# Patient Record
Sex: FEMALE | Race: WHITE | NOT HISPANIC OR LATINO
[De-identification: names, ages, dates, MRNs, and addresses within clinical notes are randomized per-mention and may not be internally consistent; named-entity substitution may affect disease eponyms.]

---

## 2017-11-30 ENCOUNTER — RESULT REVIEW (OUTPATIENT)
Age: 57
End: 2017-11-30

## 2019-07-05 ENCOUNTER — RECORD ABSTRACTING (OUTPATIENT)
Age: 59
End: 2019-07-05

## 2019-07-05 DIAGNOSIS — Z86.39 PERSONAL HISTORY OF OTHER ENDOCRINE, NUTRITIONAL AND METABOLIC DISEASE: ICD-10-CM

## 2019-07-05 DIAGNOSIS — N95.2 POSTMENOPAUSAL ATROPHIC VAGINITIS: ICD-10-CM

## 2019-07-05 DIAGNOSIS — N76.2 ACUTE VULVITIS: ICD-10-CM

## 2019-07-05 DIAGNOSIS — Z87.19 PERSONAL HISTORY OF OTHER DISEASES OF THE DIGESTIVE SYSTEM: ICD-10-CM

## 2019-07-05 DIAGNOSIS — Z87.39 PERSONAL HISTORY OF OTHER DISEASES OF THE MUSCULOSKELETAL SYSTEM AND CONNECTIVE TISSUE: ICD-10-CM

## 2019-07-05 DIAGNOSIS — Z82.0 FAMILY HISTORY OF EPILEPSY AND OTHER DISEASES OF THE NERVOUS SYSTEM: ICD-10-CM

## 2019-07-05 DIAGNOSIS — Z83.3 FAMILY HISTORY OF DIABETES MELLITUS: ICD-10-CM

## 2019-07-05 DIAGNOSIS — Z87.09 PERSONAL HISTORY OF OTHER DISEASES OF THE RESPIRATORY SYSTEM: ICD-10-CM

## 2019-07-05 DIAGNOSIS — Z80.3 FAMILY HISTORY OF MALIGNANT NEOPLASM OF BREAST: ICD-10-CM

## 2019-07-05 DIAGNOSIS — Z82.3 FAMILY HISTORY OF STROKE: ICD-10-CM

## 2019-07-05 DIAGNOSIS — Z87.59 PERSONAL HISTORY OF OTHER COMPLICATIONS OF PREGNANCY, CHILDBIRTH AND THE PUERPERIUM: ICD-10-CM

## 2019-07-05 DIAGNOSIS — Z82.49 FAMILY HISTORY OF ISCHEMIC HEART DISEASE AND OTHER DISEASES OF THE CIRCULATORY SYSTEM: ICD-10-CM

## 2019-07-05 LAB — CYTOLOGY CVX/VAG DOC THIN PREP: NORMAL

## 2019-07-05 RX ORDER — ATORVASTATIN CALCIUM 20 MG/1
20 TABLET, FILM COATED ORAL DAILY
Refills: 0 | Status: ACTIVE | COMMUNITY

## 2019-07-05 RX ORDER — ASCORBIC ACID 1000 MG
TABLET ORAL
Refills: 0 | Status: ACTIVE | COMMUNITY

## 2019-09-19 ENCOUNTER — APPOINTMENT (OUTPATIENT)
Dept: OBGYN | Facility: CLINIC | Age: 59
End: 2019-09-19
Payer: COMMERCIAL

## 2019-09-19 VITALS
SYSTOLIC BLOOD PRESSURE: 114 MMHG | WEIGHT: 198 LBS | BODY MASS INDEX: 36.44 KG/M2 | DIASTOLIC BLOOD PRESSURE: 70 MMHG | HEIGHT: 62 IN

## 2019-09-19 PROCEDURE — 99396 PREV VISIT EST AGE 40-64: CPT

## 2019-09-21 LAB — HPV HIGH+LOW RISK DNA PNL CVX: NOT DETECTED

## 2019-09-24 LAB — CYTOLOGY CVX/VAG DOC THIN PREP: ABNORMAL

## 2020-07-17 DIAGNOSIS — N95.1 MENOPAUSAL AND FEMALE CLIMACTERIC STATES: ICD-10-CM

## 2020-07-17 DIAGNOSIS — Z13.820 ENCOUNTER FOR SCREENING FOR OSTEOPOROSIS: ICD-10-CM

## 2020-10-07 ENCOUNTER — RX RENEWAL (OUTPATIENT)
Age: 60
End: 2020-10-07

## 2021-04-01 ENCOUNTER — APPOINTMENT (OUTPATIENT)
Dept: OBGYN | Facility: CLINIC | Age: 61
End: 2021-04-01
Payer: COMMERCIAL

## 2021-04-01 VITALS
HEIGHT: 62 IN | DIASTOLIC BLOOD PRESSURE: 80 MMHG | BODY MASS INDEX: 36.07 KG/M2 | SYSTOLIC BLOOD PRESSURE: 122 MMHG | WEIGHT: 196 LBS

## 2021-04-01 DIAGNOSIS — R92.0 MAMMOGRAPHIC MICROCALCIFICATION FOUND ON DIAGNOSTIC IMAGING OF BREAST: ICD-10-CM

## 2021-04-01 PROCEDURE — 99396 PREV VISIT EST AGE 40-64: CPT

## 2021-04-01 PROCEDURE — 99072 ADDL SUPL MATRL&STAF TM PHE: CPT

## 2021-04-01 RX ORDER — ESTRADIOL 10 UG/1
10 INSERT VAGINAL
Refills: 0 | Status: DISCONTINUED | COMMUNITY
End: 2021-04-01

## 2021-04-01 RX ORDER — ESTRADIOL 10 UG/1
10 TABLET VAGINAL
Qty: 24 | Refills: 3 | Status: DISCONTINUED | COMMUNITY
End: 2021-04-01

## 2021-04-01 NOTE — HISTORY OF PRESENT ILLNESS
[Patient reported mammogram was normal] : Patient reported mammogram was normal [Patient reported colonoscopy was normal] : Patient reported colonoscopy was normal [Mammogramdate] : 12/3/20 [FreeTextEntry1] : 59 y/o  Postmenopausal using Vagifem twice weekly here for annual gyn exam. Pt is S/P LSO for benign papillary cystadenoma . Per patient reports feeling down, lost her brother in December with cancer(stomach/lower intestine). Has c/o left LE swelling and varicosities. Denies any complaints of breast changes including palpable lumps, discharge, or overlying skin changes. Mother with hx of DCIS which was diagnosed in her 80s and treated with radiation. Denies hx of abnormal mammograms in the past. Had last Mammo at [place]. States she is currently sexually active with her  of many years but has no libido. Her daughter is currently home suffering with depression and anxiety in day treatment program through [facility].Pt acknowledges that she is concerned about her cognitive function as her mother had dementia. Her daughter is pushing her to have neurologic evaluation.Patient has tracheal stenosis, opted out of major thoracic surgery [facility], had repeat dilatation done at [facility] last July Pt has a h/o osteopenia and was treated in the past with bisphosphonate, stopped for dental work in ~   \par  [TextBox_19] : WPH [PapSmeardate] : 9/19/19 [TextBox_31] : Neg/HPV Neg [TextBox_37] : [facility] T Score Spine -2.7 Hip -1.1 Fem Neck -1.0 [BoneDensityDate] : 12/3/20 [ColonoscopyDate] : 2016

## 2021-07-28 ENCOUNTER — APPOINTMENT (OUTPATIENT)
Dept: NEUROLOGY | Facility: CLINIC | Age: 61
End: 2021-07-28
Payer: COMMERCIAL

## 2021-07-28 VITALS
OXYGEN SATURATION: 98 % | TEMPERATURE: 97.8 F | BODY MASS INDEX: 36.25 KG/M2 | HEART RATE: 91 BPM | DIASTOLIC BLOOD PRESSURE: 85 MMHG | HEIGHT: 62 IN | SYSTOLIC BLOOD PRESSURE: 140 MMHG | WEIGHT: 197 LBS

## 2021-07-28 PROCEDURE — 99244 OFF/OP CNSLTJ NEW/EST MOD 40: CPT

## 2021-08-02 NOTE — ASSESSMENT
[FreeTextEntry1] : For her cognitive complaints, I will obtain MRI of the brain.  She will get an formal neuropsychological evaluation.\par \par It has been recommended that she get a sleep study, and I will order this now.  She is willing to do this after I explained that untreated sleep apnea could also cause memory issues.\par \par Further recommendations after test results are available.

## 2021-08-02 NOTE — HISTORY OF PRESENT ILLNESS
[FreeTextEntry1] : This is a 61-year-old right-handed woman who is being seen in neurologic consultation for evaluation of problems with memory.  She notes difficulty in both short-term and long-term memory.  She is more forgetful.  There is significant word finding problems.  She has not noticed any problems driving or recognizing people.  Her  pays the bills so she has not noticed any problems.  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 Was advised to get sleep study but has not done so.\par \par \par Mother- dementia and PD\par

## 2021-08-02 NOTE — CONSULT LETTER
[Dear  ___] : Dear  [unfilled], [Consult Letter:] : I had the pleasure of evaluating your patient, [unfilled]. [Please see my note below.] : Please see my note below. [FreeTextEntry3] : Sincerely,\par \par Melida Gatica M.D.\par

## 2021-08-02 NOTE — PHYSICAL EXAM
[FreeTextEntry1] : Physical examination \par General: No acute distress, Awake, Alert. \par Fundus: Unable\par Neck: no Carotid bruit. \par Cardiovascular: Normal rate, Regular rhythm, No murmur. \par Chest - clear bilaterally\par \par Mental status \par Awake, alert, and oriented to person, time and place, Normal attention span and concentration, Recent and remote memory intact, Language intact, Fund of knowledge intact. \par Cranial Nerves \par II: VFF \par III, IV, VI: PERRL, EOMI. \par V: Facial sensation is normal B/L. \par VII: Facial strength is normal B/L. \par VIII: Gross hearing is intact. \par IX, X: Palate is midline and elevates symmetrically. \par XI: Trapezius normal strength. \par XII: Tongue midline without atrophy or fasciculations. \par \par Motor exam \par Muscle tone - no evidence of rigidity or resistance in all 4 extremities. \par No atrophy or fasciculations \par Muscle Strength: arms and legs, proximal and distal flexors and extensors are normal \par No UE drift.\par \par Reflexes \par All present, normal, and symmetrical. \par Plantars right: mute. \par Plantars left: mute. \par Absent ankle jerks. \par \par Coordination \par Finger to nose: Normal. \par Heel to shin: Normal. \par \par Gait \par Normal\par

## 2021-09-15 ENCOUNTER — RESULT REVIEW (OUTPATIENT)
Age: 61
End: 2021-09-15

## 2021-10-06 ENCOUNTER — APPOINTMENT (OUTPATIENT)
Dept: NEUROLOGY | Facility: CLINIC | Age: 61
End: 2021-10-06
Payer: COMMERCIAL

## 2021-10-06 ENCOUNTER — NON-APPOINTMENT (OUTPATIENT)
Age: 61
End: 2021-10-06

## 2021-10-06 ENCOUNTER — TRANSCRIPTION ENCOUNTER (OUTPATIENT)
Age: 61
End: 2021-10-06

## 2021-10-06 PROCEDURE — 95806 SLEEP STUDY UNATT&RESP EFFT: CPT

## 2021-10-07 ENCOUNTER — APPOINTMENT (OUTPATIENT)
Dept: NEUROLOGY | Facility: CLINIC | Age: 61
End: 2021-10-07

## 2021-10-13 ENCOUNTER — NON-APPOINTMENT (OUTPATIENT)
Age: 61
End: 2021-10-13

## 2021-11-15 ENCOUNTER — TRANSCRIPTION ENCOUNTER (OUTPATIENT)
Age: 61
End: 2021-11-15

## 2022-03-28 ENCOUNTER — APPOINTMENT (OUTPATIENT)
Dept: NEUROLOGY | Facility: CLINIC | Age: 62
End: 2022-03-28
Payer: COMMERCIAL

## 2022-03-28 VITALS
DIASTOLIC BLOOD PRESSURE: 86 MMHG | TEMPERATURE: 98.2 F | OXYGEN SATURATION: 99 % | BODY MASS INDEX: 36.8 KG/M2 | HEIGHT: 62 IN | HEART RATE: 95 BPM | SYSTOLIC BLOOD PRESSURE: 143 MMHG | WEIGHT: 200 LBS

## 2022-03-28 DIAGNOSIS — G47.33 OBSTRUCTIVE SLEEP APNEA (ADULT) (PEDIATRIC): ICD-10-CM

## 2022-03-28 PROCEDURE — 99214 OFFICE O/P EST MOD 30 MIN: CPT

## 2022-03-29 NOTE — REVIEW OF SYSTEMS
[Joint Pain] : joint pain [Joint Stiffness] : joint stiffness [Negative] : ENT [Fever] : no fever [Chills] : no chills [Feeling Tired] : not feeling tired [Numbness] : no numbness [Tingling] : no tingling [Seizures] : no convulsions [Dizziness] : no dizziness [Tension Headache] : no tension-type headache [Anxiety] : no anxiety [Depression] : no depression [Eye Pain] : no eye pain [Red Eyes] : eyes not red [Loss Of Hearing] : no hearing loss [Chest Pain] : no chest pain [Palpitations] : no palpitations [Shortness Of Breath] : no shortness of breath [Wheezing] : no wheezing [Cough] : no cough [Constipation] : no constipation [Diarrhea] : no diarrhea [Incontinence] : no incontinence [Pelvic Pain] : no pelvic pain [Joint Swelling] : no joint swelling [Itching] : no itching [Muscle Weakness] : no muscle weakness [Easy Bleeding] : no tendency for easy bleeding [Easy Bruising] : no tendency for easy bruising [Swollen Glands] : no swollen glands

## 2022-03-29 NOTE — ASSESSMENT
[FreeTextEntry1] : Exercise daily\par Conside therapy for depression and anxiety\par consider SSRI- willing to try Lexapro - will see psychiatrist.\par \par Recommend f/u with sleep specialist.\par \par Discussed importance of being physically, socially, and mentally active.\par \par f/u one year.

## 2022-03-29 NOTE — HISTORY OF PRESENT ILLNESS
[FreeTextEntry1] : 3/28/22\par Leticia is here in f/u. Continues to note memory issues.\par Reviewed MRI brain and neuropsychological evaluation.\par Not sleeping well. Reviewed Sleep study result.\par \par Neuropsycholgical evaluation by Dr. Reed shows no evidence for neuro-degenerative disease. Raises concern for depression and anxiety.\par \par 7/28/21\par This is a 61-year-old right-handed woman who is being seen in neurologic consultation for evaluation of problems with memory.  She notes difficulty in both short-term and long-term memory.  She is more forgetful.  There is significant word finding problems.  She has not noticed any problems driving or recognizing people.  Her  pays the bills so she has not noticed any problems.  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 Was advised to get sleep study but has not done so.\par \par \par Mother- dementia and PD\par

## 2022-03-29 NOTE — PHYSICAL EXAM
[FreeTextEntry1] : Physical examination \par General: No acute distress, Awake, Alert. \par \par Mental status \par Awake, alert, and oriented to person, time and place, Normal attention span and concentration, Recent and remote memory intact, Language intact, Fund of knowledge intact. \par Cranial Nerves \par II: VFF \par III, IV, VI: PERRL, EOMI. \par V: Facial sensation is normal B/L. \par VII: Facial strength is normal B/L. \par VIII: Gross hearing is intact. \par IX, X: Palate is midline and elevates symmetrically. \par XI: Trapezius normal strength. \par XII: Tongue midline without atrophy or fasciculations. \par \par Motor exam \par Muscle tone - no evidence of rigidity or resistance in all 4 extremities. \par No atrophy or fasciculations \par Muscle Strength: arms and legs, proximal and distal flexors and extensors are normal \par No UE drift.\par \par Coordination \par Finger to nose: Normal. \par Heel to shin: Normal. \par \par Gait \par Normal\par

## 2022-03-29 NOTE — DATA REVIEWED
[de-identified] : \par  [place] MRI Report             Final\par \par No Documents Attached\par \par \par \par \par   [facility]\par                                          [address]\par                                    [place], [place]  [address]\par                                        Department of Radiology\par                                             [phone]\par \par \par Patient Name:      DIOGO ARNOLD                Location:       [facility]\par Med Rec #:        [id number]                    Account #:      [id number]\par YOB: 1960                    Ordering:       Daisy Gatica MD\par Age: 61               Sex:    F                 Attending:      Daisy Gatica MD\par PCP:        Emiliano Garrison MD\par ______________________________________________________________________________________\par \par Exam Date:      09/15/21\par Exam:         MRI BRAIN\par Order#:       MRI [id number]\par \par \par \par HISTORY: 61 years Female MEMORY CHANGE MRI\par \par  PROCEDURE: MRI brain performed without contrast\par \par  COMPARISON: None\par \par  TECHNIQUE:  MRI BRAIN 1.5 Tea magnet without administration of intravenous contrast.\par \par \par  FINDINGS:\par  Sagittal T1-weighted imaging demonstrates normal appearance of the midline structures including the\par clivus, sella, and craniocervical junction.\par \par  There is no extra-axial collection, intraparenchymal hemorrhage, midline shift, or mass effect\par \par  The ventricles and basilar cisterns appear within range of normal.\par \par  There is no restricted diffusion on today's exam to suggest a recent ischemic event.\par \par  There is no abnormal susceptibility artifact to suggest microhemorrhage is or deposition disease.\par \par  The central [place] of Kim flow voids and major dural venous sinus flow voids are present.\par \par  The extracranial tissues including the orbits appear within range of normal.\par \par \par \par  IMPRESSION:\par  No evidence of acute intracranial process\par \par  Thank you for allowing us to participate in the evaluation of this patient.\par \par  --- End of Report ---\par \par ***Electronically Signed ***\par -----------------------------------------------\par René Hoclomb MD              09/15/21 1424\par \par Dictated on 09/15/21\par \par \par Report cc:  Daisy Gatica MD;\par \par  \par \par  Ordered by: DAISY GATICA       Collected/Examined: 15Sep2021 09:19AM       \par Verified by: DAISY GATICA 15Sep2021 03:37PM       \par  Result Communication: No patient communication needed at this time;\par Stage: Final       \par  Performed at: [facility]       Resulted: 15Sep2021 02:24PM       Last Updated: 15Sep2021 03:37PM       Accession: [id number]

## 2022-04-26 ENCOUNTER — APPOINTMENT (OUTPATIENT)
Dept: ENDOCRINOLOGY | Facility: CLINIC | Age: 62
End: 2022-04-26
Payer: COMMERCIAL

## 2022-04-26 VITALS — OXYGEN SATURATION: 98 % | HEART RATE: 75 BPM | DIASTOLIC BLOOD PRESSURE: 84 MMHG | SYSTOLIC BLOOD PRESSURE: 128 MMHG

## 2022-04-26 VITALS — WEIGHT: 194 LBS | HEIGHT: 62 IN | BODY MASS INDEX: 35.7 KG/M2

## 2022-04-26 DIAGNOSIS — R35.89 OTHER POLYURIA: ICD-10-CM

## 2022-04-26 DIAGNOSIS — R53.82 CHRONIC FATIGUE, UNSPECIFIED: ICD-10-CM

## 2022-04-26 DIAGNOSIS — R53.81 CHRONIC FATIGUE, UNSPECIFIED: ICD-10-CM

## 2022-04-26 PROCEDURE — 99205 OFFICE O/P NEW HI 60 MIN: CPT

## 2022-04-26 RX ORDER — ADHESIVE TAPE 3"X 2.3 YD
5 TAPE, NON-MEDICATED TOPICAL
Refills: 0 | Status: ACTIVE | COMMUNITY

## 2022-04-26 RX ORDER — LORATADINE 5 MG/5 ML
10 SOLUTION, ORAL ORAL
Refills: 0 | Status: ACTIVE | COMMUNITY

## 2022-04-26 NOTE — REVIEW OF SYSTEMS
[Fatigue] : fatigue [Lower Ext Edema] : lower extremity edema [Cough] : cough [Nocturia] : nocturia [Myalgia] : myalgia  [Muscle Cramps] : muscle cramps [Depression] : depression [Polydipsia] : polydipsia [Negative] : Heme/Lymph [Polyuria] : no polyuria [FreeTextEntry2] : chronic fatigue [FreeTextEntry5] : venous insufficiency  [FreeTextEntry6] : loud snoring  [FreeTextEntry8] : urinary frequency  [de-identified] : itching  [de-identified] : memory problems, problems with concentration

## 2022-04-26 NOTE — HISTORY OF PRESENT ILLNESS
[FreeTextEntry1] : Ms. DIOGO ARNOLD is a 61 year old female self-referred for chronic fatigue hypothyroidism prediabetes\par Has not seen endocrinology before today with her daughter who is also a patient of mine\par hypothyroidism for 10-15yrs \par tracheal stenosis requring dilatations\par mild slpeep apnea on 10/21 sleep study reviewed\par \par also impaired fasting sugar per pt from last labwork \par \par mother Vanzoegabrielle, Miki, also she has had memory problems seen neurology was told all good \par \par TSH 4.67\par Fasting sugar 113, paternal aunt DM2\par \par FHx thyroid cancer\par

## 2022-06-02 ENCOUNTER — LABORATORY RESULT (OUTPATIENT)
Age: 62
End: 2022-06-02

## 2022-06-07 ENCOUNTER — APPOINTMENT (OUTPATIENT)
Dept: ENDOCRINOLOGY | Facility: CLINIC | Age: 62
End: 2022-06-07
Payer: COMMERCIAL

## 2022-06-07 VITALS — WEIGHT: 189 LBS | BODY MASS INDEX: 34.78 KG/M2 | HEIGHT: 62 IN

## 2022-06-07 LAB
ALBUMIN SERPL ELPH-MCNC: 4.8 G/DL
ALP BLD-CCNC: 88 U/L
ALT SERPL-CCNC: 21 U/L
ANION GAP SERPL CALC-SCNC: 14 MMOL/L
APPEARANCE: CLEAR
AST SERPL-CCNC: 23 U/L
BILIRUB SERPL-MCNC: 0.4 MG/DL
BILIRUBIN URINE: NEGATIVE
BLOOD URINE: NEGATIVE
BUN SERPL-MCNC: 9 MG/DL
CALCIUM SERPL-MCNC: 9.6 MG/DL
CHLORIDE SERPL-SCNC: 98 MMOL/L
CHOLEST SERPL-MCNC: 188 MG/DL
CO2 SERPL-SCNC: 25 MMOL/L
COLOR: NORMAL
CREAT SERPL-MCNC: 0.63 MG/DL
EGFR: 100 ML/MIN/1.73M2
ESTIMATED AVERAGE GLUCOSE: 108 MG/DL
FERRITIN SERPL-MCNC: 78 NG/ML
FOLATE SERPL-MCNC: >20 NG/ML
GLUCOSE QUALITATIVE U: NEGATIVE
GLUCOSE SERPL-MCNC: 99 MG/DL
HBA1C MFR BLD HPLC: 5.4 %
HDLC SERPL-MCNC: 39 MG/DL
IRON SATN MFR SERPL: 29 %
IRON SERPL-MCNC: 96 UG/DL
KETONES URINE: NEGATIVE
LDLC SERPL CALC-MCNC: 122 MG/DL
LEUKOCYTE ESTERASE URINE: NEGATIVE
NITRITE URINE: NEGATIVE
NONHDLC SERPL-MCNC: 148 MG/DL
PH URINE: 7.5
POTASSIUM SERPL-SCNC: 4.7 MMOL/L
PROT SERPL-MCNC: 6.7 G/DL
PROTEIN URINE: NEGATIVE
SODIUM SERPL-SCNC: 137 MMOL/L
SPECIFIC GRAVITY URINE: 1.01
T4 FREE SERPL-MCNC: 1.3 NG/DL
THYROGLOB AB SERPL-ACNC: <20 IU/ML
THYROPEROXIDASE AB SERPL IA-ACNC: <10 IU/ML
TIBC SERPL-MCNC: 327 UG/DL
TRIGL SERPL-MCNC: 131 MG/DL
TSH SERPL-ACNC: 2.26 UIU/ML
UIBC SERPL-MCNC: 231 UG/DL
UROBILINOGEN URINE: NORMAL
VIT B12 SERPL-MCNC: 1141 PG/ML

## 2022-06-07 PROCEDURE — 99215 OFFICE O/P EST HI 40 MIN: CPT | Mod: 95

## 2022-06-07 RX ORDER — LEVOTHYROXINE SODIUM 0.05 MG/1
50 TABLET ORAL DAILY
Refills: 0 | Status: DISCONTINUED | COMMUNITY
End: 2022-06-07

## 2022-06-07 RX ORDER — CETIRIZINE HYDROCHLORIDE 10 MG/1
10 TABLET, FILM COATED ORAL
Refills: 0 | Status: DISCONTINUED | COMMUNITY
End: 2022-06-07

## 2022-06-07 RX ORDER — ESCITALOPRAM OXALATE 10 MG/1
10 TABLET ORAL
Qty: 30 | Refills: 2 | Status: DISCONTINUED | COMMUNITY
Start: 2022-03-28 | End: 2022-06-07

## 2022-06-07 NOTE — HISTORY OF PRESENT ILLNESS
[Home] : at home, [unfilled] , at the time of the visit. [Medical Office: ([facility])___] : at the medical office located in  [Verbal consent obtained from patient] : the patient, [unfilled] [FreeTextEntry1] : Ms. DIOGO ARNOLD is a 61 year old female self-referred for chronic fatigue hypothyroidism prediabetes today first follow-up\par Has not seen endocrinology before today with her daughter who is also a patient of mine\par hypothyroidism for 10-15yrs \par tracheal stenosis requring dilatations\par mild slpeep apnea on 10/21 sleep study reviewed\par \par also impaired fasting sugar per pt from last labwork \par \par mother LouisagabrielleMiki, also she has had memory problems seen neurology was told all good \par \par TSH 4.67\par Fasting sugar 113, paternal aunt DM2\par \par FHx thyroid cancer\par

## 2022-06-07 NOTE — REVIEW OF SYSTEMS
[Fatigue] : fatigue [Lower Ext Edema] : lower extremity edema [Cough] : cough [Nocturia] : nocturia [Myalgia] : myalgia  [Muscle Cramps] : muscle cramps [Depression] : depression [Polydipsia] : polydipsia [Negative] : Heme/Lymph [Polyuria] : no polyuria [FreeTextEntry2] : chronic fatigue [FreeTextEntry6] : loud snoring  [FreeTextEntry5] : venous insufficiency  [FreeTextEntry8] : urinary frequency  [de-identified] : itching  [de-identified] : memory problems, problems with concentration

## 2022-06-09 ENCOUNTER — RESULT REVIEW (OUTPATIENT)
Age: 62
End: 2022-06-09

## 2022-06-24 ENCOUNTER — TRANSCRIPTION ENCOUNTER (OUTPATIENT)
Age: 62
End: 2022-06-24

## 2022-06-24 PROBLEM — R92.0 BREAST MICROCALCIFICATIONS: Status: ACTIVE | Noted: 2022-06-24

## 2022-07-15 DIAGNOSIS — N63.0 UNSPECIFIED LUMP IN UNSPECIFIED BREAST: ICD-10-CM

## 2022-09-07 ENCOUNTER — RX RENEWAL (OUTPATIENT)
Age: 62
End: 2022-09-07

## 2022-09-12 ENCOUNTER — APPOINTMENT (OUTPATIENT)
Dept: ENDOCRINOLOGY | Facility: CLINIC | Age: 62
End: 2022-09-12

## 2022-09-12 VITALS
DIASTOLIC BLOOD PRESSURE: 80 MMHG | HEIGHT: 62 IN | HEART RATE: 70 BPM | BODY MASS INDEX: 34.23 KG/M2 | OXYGEN SATURATION: 98 % | SYSTOLIC BLOOD PRESSURE: 124 MMHG | WEIGHT: 186 LBS

## 2022-09-12 LAB
25(OH)D3 SERPL-MCNC: 41.2 NG/ML
ALBUMIN SERPL ELPH-MCNC: 4.5 G/DL
ALP BLD-CCNC: 78 U/L
ALT SERPL-CCNC: 17 U/L
ANION GAP SERPL CALC-SCNC: 10 MMOL/L
AST SERPL-CCNC: 20 U/L
BILIRUB SERPL-MCNC: 0.3 MG/DL
BUN SERPL-MCNC: 13 MG/DL
CALCIUM SERPL-MCNC: 9.8 MG/DL
CALCIUM SERPL-MCNC: 9.8 MG/DL
CHLORIDE SERPL-SCNC: 100 MMOL/L
CO2 SERPL-SCNC: 27 MMOL/L
CREAT SERPL-MCNC: 0.68 MG/DL
EGFR: 97 ML/MIN/1.73M2
GLUCOSE SERPL-MCNC: 106 MG/DL
PARATHYROID HORMONE INTACT: 53 PG/ML
POTASSIUM SERPL-SCNC: 5.2 MMOL/L
PROT SERPL-MCNC: 6.4 G/DL
SODIUM SERPL-SCNC: 137 MMOL/L
T4 FREE SERPL-MCNC: 1.3 NG/DL
TSH SERPL-ACNC: 2.15 UIU/ML

## 2022-09-12 PROCEDURE — 99215 OFFICE O/P EST HI 40 MIN: CPT

## 2022-09-12 RX ORDER — ESCITALOPRAM OXALATE 20 MG/1
20 TABLET ORAL
Qty: 30 | Refills: 0 | Status: ACTIVE | COMMUNITY
Start: 2022-06-22

## 2022-09-12 NOTE — HISTORY OF PRESENT ILLNESS
[Home] : at home, [unfilled] , at the time of the visit. [Medical Office: ([facility])___] : at the medical office located in  [Verbal consent obtained from patient] : the patient, [unfilled] [FreeTextEntry1] : Ms. DIOGO ARNOLD is a 62 year old female self-referred for chronic fatigue hypothyroidism prediabetes today first follow-up\par restarted vaginal estrogen today \par Has not seen endocrinology before\par  her daughter  a patient of mine\par hypothyroidism for 10-15yrs \par tracheal stenosis requring dilatations\par mild slpeep apnea on 10/21 sleep study reviewed\par \par also impaired fasting sugar per pt from last labwork \par \par mother Miki Mena, also she has had memory problems seen neurology was told all good \par \par TSH 4.67\par Fasting sugar 113, paternal aunt DM2\par \par FHx thyroid cancer\par

## 2022-09-12 NOTE — REVIEW OF SYSTEMS
[Fatigue] : fatigue [Lower Ext Edema] : lower extremity edema [Cough] : cough [Nocturia] : nocturia [Myalgia] : myalgia  [Muscle Cramps] : muscle cramps [Depression] : depression [Polydipsia] : polydipsia [Negative] : Heme/Lymph [Polyuria] : no polyuria [FreeTextEntry2] : chronic fatigue [FreeTextEntry5] : venous insufficiency  [FreeTextEntry6] : loud snoring  [FreeTextEntry8] : urinary frequency  [de-identified] : itching  [de-identified] : memory problems, problems with concentration

## 2022-10-24 ENCOUNTER — APPOINTMENT (OUTPATIENT)
Dept: ENDOCRINOLOGY | Facility: CLINIC | Age: 62
End: 2022-10-24

## 2022-12-06 ENCOUNTER — APPOINTMENT (OUTPATIENT)
Dept: OBGYN | Facility: CLINIC | Age: 62
End: 2022-12-06

## 2022-12-06 VITALS
BODY MASS INDEX: 33.49 KG/M2 | HEIGHT: 62 IN | DIASTOLIC BLOOD PRESSURE: 80 MMHG | SYSTOLIC BLOOD PRESSURE: 130 MMHG | WEIGHT: 182 LBS

## 2022-12-06 DIAGNOSIS — Z92.89 PERSONAL HISTORY OF OTHER MEDICAL TREATMENT: ICD-10-CM

## 2022-12-06 DIAGNOSIS — Z11.51 ENCOUNTER FOR SCREENING FOR HUMAN PAPILLOMAVIRUS (HPV): ICD-10-CM

## 2022-12-06 DIAGNOSIS — Z98.890 OTHER SPECIFIED POSTPROCEDURAL STATES: ICD-10-CM

## 2022-12-06 DIAGNOSIS — Z12.39 ENCOUNTER FOR OTHER SCREENING FOR MALIGNANT NEOPLASM OF BREAST: ICD-10-CM

## 2022-12-06 DIAGNOSIS — Z12.4 ENCOUNTER FOR SCREENING FOR MALIGNANT NEOPLASM OF CERVIX: ICD-10-CM

## 2022-12-06 PROCEDURE — 99396 PREV VISIT EST AGE 40-64: CPT

## 2022-12-06 RX ORDER — ACETAMINOPHEN AND CODEINE PHOSPHATE 300; 30 MG/1; MG/1
300-30 TABLET ORAL
Qty: 10 | Refills: 0 | Status: DISCONTINUED | COMMUNITY
Start: 2022-12-02

## 2022-12-06 RX ORDER — COVID-19 ANTIGEN TEST
KIT MISCELLANEOUS
Qty: 5 | Refills: 0 | Status: DISCONTINUED | COMMUNITY
Start: 2022-10-14

## 2022-12-06 RX ORDER — AMOXICILLIN 500 MG/1
500 CAPSULE ORAL
Qty: 21 | Refills: 0 | Status: DISCONTINUED | COMMUNITY
Start: 2022-12-02

## 2022-12-06 NOTE — PHYSICAL EXAM
[Chaperone Declined] : Patient declined chaperone [Appropriately responsive] : appropriately responsive [Alert] : alert [No Acute Distress] : no acute distress [No Lymphadenopathy] : no lymphadenopathy [Regular Rate Rhythm] : regular rate rhythm [No Murmurs] : no murmurs [Clear to Auscultation B/L] : clear to auscultation bilaterally [Soft] : soft [Non-tender] : non-tender [Non-distended] : non-distended [No HSM] : No HSM [No Mass] : no mass [Oriented x3] : oriented x3 [Examination Of The Breasts] : a normal appearance [No Discharge] : no discharge [No Masses] : no breast masses were palpable [No Lesions] : no lesions  [Labia Majora] : normal [Labia Minora] : normal [Normal] : normal [Uterine Adnexae] : normal [Pink Rugae] : pink rugae [Tenderness] : nontender [Enlarged ___ wks] : not enlarged [FreeTextEntry5] : no CMT

## 2022-12-06 NOTE — HISTORY OF PRESENT ILLNESS
[FreeTextEntry1] : 63 y/o  PM presents for annual GYN exam.\par \par Still sexually active with . Sometimes forgets to use Vagifem (Encouraged regular use for optimal results).\par \par Feels better emotionally. Seeing psychiatrist and therapist. Was told memory tests were normal and likely symptoms were related to depression.\par \par Seeing vein specialist for venous insufficiency.\par \par Scheduled for tracheal dilation.\par \par No PMB bleeding. No GYN complaints.\par \par S/P benign left breast biopsy in July. Needs to schedule 6 month f/u.\par \par Bone density 2020 osteopenia- followed by Dr. Inman. Needs to schedule repeat.\par \par Pt reports normal colonoscopy in .\par \par Pap  19- NILM/HPV-\par \par Mother breast ca.\par \par Denies FH of Ca of ovary, colon, or uterus.\par \par Lives with  and 25 year old daughter who has history of depression (now improved).\par \par Retired teacher.

## 2022-12-07 LAB — HPV HIGH+LOW RISK DNA PNL CVX: NOT DETECTED

## 2022-12-15 ENCOUNTER — TRANSCRIPTION ENCOUNTER (OUTPATIENT)
Age: 62
End: 2022-12-15

## 2022-12-15 LAB — CYTOLOGY CVX/VAG DOC THIN PREP: ABNORMAL

## 2023-01-19 ENCOUNTER — APPOINTMENT (OUTPATIENT)
Dept: ENDOCRINOLOGY | Facility: CLINIC | Age: 63
End: 2023-01-19

## 2023-01-26 DIAGNOSIS — Z98.890 OTHER SPECIFIED POSTPROCEDURAL STATES: ICD-10-CM

## 2023-02-02 ENCOUNTER — APPOINTMENT (OUTPATIENT)
Dept: ENDOCRINOLOGY | Facility: CLINIC | Age: 63
End: 2023-02-02
Payer: COMMERCIAL

## 2023-02-02 VITALS
SYSTOLIC BLOOD PRESSURE: 132 MMHG | DIASTOLIC BLOOD PRESSURE: 80 MMHG | WEIGHT: 187 LBS | HEIGHT: 62 IN | BODY MASS INDEX: 34.41 KG/M2 | HEART RATE: 82 BPM | OXYGEN SATURATION: 98 %

## 2023-02-02 LAB
25(OH)D3 SERPL-MCNC: 31.7 NG/ML
ALBUMIN SERPL ELPH-MCNC: 4.6 G/DL
ALP BLD-CCNC: 81 U/L
ALT SERPL-CCNC: 22 U/L
ANION GAP SERPL CALC-SCNC: 15 MMOL/L
AST SERPL-CCNC: 22 U/L
BILIRUB SERPL-MCNC: 0.3 MG/DL
BUN SERPL-MCNC: 14 MG/DL
CALCIUM SERPL-MCNC: 9.9 MG/DL
CALCIUM SERPL-MCNC: 9.9 MG/DL
CHLORIDE SERPL-SCNC: 97 MMOL/L
CHOLEST SERPL-MCNC: 208 MG/DL
CO2 SERPL-SCNC: 25 MMOL/L
CREAT SERPL-MCNC: 0.65 MG/DL
EGFR: 98 ML/MIN/1.73M2
ESTIMATED AVERAGE GLUCOSE: 111 MG/DL
GLUCOSE SERPL-MCNC: 100 MG/DL
HBA1C MFR BLD HPLC: 5.5 %
HDLC SERPL-MCNC: 54 MG/DL
LDLC SERPL CALC-MCNC: 131 MG/DL
MAGNESIUM SERPL-MCNC: 2 MG/DL
NONHDLC SERPL-MCNC: 154 MG/DL
PARATHYROID HORMONE INTACT: 31 PG/ML
PHOSPHATE SERPL-MCNC: 4.2 MG/DL
POTASSIUM SERPL-SCNC: 5.6 MMOL/L
PROT SERPL-MCNC: 6.6 G/DL
SODIUM SERPL-SCNC: 137 MMOL/L
T4 FREE SERPL-MCNC: 1.3 NG/DL
TRIGL SERPL-MCNC: 115 MG/DL
TSH SERPL-ACNC: 1.93 UIU/ML

## 2023-02-02 PROCEDURE — G0447 BEHAVIOR COUNSEL OBESITY 15M: CPT

## 2023-02-02 PROCEDURE — 99215 OFFICE O/P EST HI 40 MIN: CPT | Mod: 25

## 2023-02-02 RX ORDER — CALCIUM CARBONATE/VITAMIN D3 600MG-5MCG
TABLET ORAL
Refills: 0 | Status: ACTIVE | COMMUNITY

## 2023-02-02 RX ORDER — CALCIUM CARBONATE/VITAMIN D3 500 MG-2.5
TABLET,CHEWABLE ORAL
Refills: 0 | Status: DISCONTINUED | COMMUNITY
End: 2023-02-02

## 2023-02-02 RX ORDER — SODIUM SULFATE, MAGNESIUM SULFATE, AND POTASSIUM CHLORIDE 17.75; 2.7; 2.25 G/1; G/1; G/1
1479-225-188 TABLET ORAL
Qty: 24 | Refills: 0 | Status: DISCONTINUED | COMMUNITY
Start: 2022-04-11 | End: 2023-02-02

## 2023-02-02 RX ORDER — FAMOTIDINE 20 MG/1
20 TABLET, FILM COATED ORAL
Refills: 0 | Status: ACTIVE | COMMUNITY

## 2023-02-02 NOTE — REVIEW OF SYSTEMS
[Fatigue] : fatigue [Lower Ext Edema] : lower extremity edema [Cough] : cough [Nocturia] : nocturia [Myalgia] : myalgia  [Muscle Cramps] : muscle cramps [Depression] : depression [Polydipsia] : polydipsia [Negative] : Heme/Lymph [Polyuria] : no polyuria [FreeTextEntry2] : chronic fatigue [FreeTextEntry6] : loud snoring  [FreeTextEntry5] : venous insufficiency  [FreeTextEntry8] : urinary frequency  [de-identified] : itching  [de-identified] : memory problems, problems with concentration

## 2023-02-02 NOTE — HISTORY OF PRESENT ILLNESS
[FreeTextEntry1] : Ms. DIOGO ARNOLD is a 62 year old female self-referred for chronic fatigue hypothyroidism prediabetes today first follow-up\par restarted vaginal estrogen today \par Has not seen endocrinology before\par  her daughter  a patient of mine\par hypothyroidism for 10-15yrs \par tracheal stenosis requring dilatations\par mild slpeep apnea on 10/21 sleep study reviewed\par \par also impaired fasting sugar per pt from last labwork \par \par mother Miki Mena, also she has had memory problems seen neurology was told all good \par \par TSH 4.67\par Fasting sugar 113, paternal aunt DM2\par \par FHx thyroid cancer\par

## 2023-03-28 ENCOUNTER — APPOINTMENT (OUTPATIENT)
Dept: NEUROLOGY | Facility: CLINIC | Age: 63
End: 2023-03-28
Payer: COMMERCIAL

## 2023-03-28 VITALS
WEIGHT: 187 LBS | HEART RATE: 83 BPM | BODY MASS INDEX: 34.41 KG/M2 | SYSTOLIC BLOOD PRESSURE: 103 MMHG | HEIGHT: 62 IN | OXYGEN SATURATION: 96 % | DIASTOLIC BLOOD PRESSURE: 64 MMHG

## 2023-03-28 PROCEDURE — 99214 OFFICE O/P EST MOD 30 MIN: CPT

## 2023-03-29 NOTE — ASSESSMENT
[FreeTextEntry1] : Exercise daily (has a new dog)\par \par Recommend f/u with sleep specialist.\par \par Discussed importance of being physically, socially, and mentally active.\par \par f/u psych \par agree with SSRI. There is question of ADD - may need a stimulant.\par \par

## 2023-03-29 NOTE — DATA REVIEWED
[de-identified] : \par  [place] MRI Report             Final\par \par No Documents Attached\par \par \par \par \par   [facility]\par                                          [address]\par                                    [place], [place]  [address]\par                                        Department of Radiology\par                                             [phone]\par \par \par Patient Name:      DIOGO ARNOLD                Location:       [facility]\par Med Rec #:        [id number]                    Account #:      [id number]\par YOB: 1960                    Ordering:       Daisy Gatica MD\par Age: 61               Sex:    F                 Attending:      Daisy Gatica MD\par PCP:        Emiliano Garrison MD\par ______________________________________________________________________________________\par \par Exam Date:      09/15/21\par Exam:         MRI BRAIN\par Order#:       MRI [id number]\par \par \par \par HISTORY: 61 years Female MEMORY CHANGE MRI\par \par  PROCEDURE: MRI brain performed without contrast\par \par  COMPARISON: None\par \par  TECHNIQUE:  MRI BRAIN 1.5 Tea magnet without administration of intravenous contrast.\par \par \par  FINDINGS:\par  Sagittal T1-weighted imaging demonstrates normal appearance of the midline structures including the\par clivus, sella, and craniocervical junction.\par \par  There is no extra-axial collection, intraparenchymal hemorrhage, midline shift, or mass effect\par \par  The ventricles and basilar cisterns appear within range of normal.\par \par  There is no restricted diffusion on today's exam to suggest a recent ischemic event.\par \par  There is no abnormal susceptibility artifact to suggest microhemorrhage is or deposition disease.\par \par  The central [place] of Kim flow voids and major dural venous sinus flow voids are present.\par \par  The extracranial tissues including the orbits appear within range of normal.\par \par \par \par  IMPRESSION:\par  No evidence of acute intracranial process\par \par  Thank you for allowing us to participate in the evaluation of this patient.\par \par  --- End of Report ---\par \par ***Electronically Signed ***\par -----------------------------------------------\par Rneé Holcomb MD              09/15/21 1424\par \par Dictated on 09/15/21\par \par \par Report cc:  Daisy Gatica MD;\par \par  \par \par  Ordered by: DAISY GATICA       Collected/Examined: 15Sep2021 09:19AM       \par Verified by: DAISY GATICA 15Sep2021 03:37PM       \par  Result Communication: No patient communication needed at this time;\par Stage: Final       \par  Performed at: [facility]       Resulted: 15Sep2021 02:24PM       Last Updated: 15Sep2021 03:37PM       Accession: [id number]

## 2023-03-29 NOTE — HISTORY OF PRESENT ILLNESS
[FreeTextEntry1] : Here in f/u.\par Doing well. \par \par Memory complaints are stable. Notes occasional word finding. \par \par Taking Escitalopram - does note it helps\par notes a motivation problem with clutter in the house - lives in apt with \par \par trying to exercise - got a dog to improve exercise \par 10, 000 steps daily with dog \par \par still trying to get a shelter routine\par [facility] \par \par seeing Dr. Mathew \par \par 3/28/22\par Leticia is here in f/u. Continues to note memory issues.\par Reviewed MRI brain and neuropsychological evaluation.\par Not sleeping well. Reviewed Sleep study result.\par \par Neuropsycholgical evaluation by Dr. Reed shows no evidence for neuro-degenerative disease. Raises concern for depression and anxiety.\par \par 7/28/21\par This is a 61-year-old right-handed woman who is being seen in neurologic consultation for evaluation of problems with memory.  She notes difficulty in both short-term and long-term memory.  She is more forgetful.  There is significant word finding problems.  She has not noticed any problems driving or recognizing people.  Her  pays the bills so she has not noticed any problems.  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 Was advised to get sleep study but has not done so.\par \par \par Mother- dementia and PD\par

## 2023-03-29 NOTE — CONSULT LETTER
[Dear  ___] : Dear  [unfilled], [Consult Letter:] : I had the pleasure of evaluating your patient, [unfilled]. [Please see my note below.] : Please see my note below. [DrMarlena  ___] : Dr. VELA [FreeTextEntry3] : Sincerely,\par \par Melida Gatica M.D.\par

## 2023-04-18 ENCOUNTER — APPOINTMENT (OUTPATIENT)
Dept: ENDOCRINOLOGY | Facility: CLINIC | Age: 63
End: 2023-04-18

## 2023-07-06 ENCOUNTER — APPOINTMENT (OUTPATIENT)
Dept: ENDOCRINOLOGY | Facility: CLINIC | Age: 63
End: 2023-07-06

## 2023-07-10 ENCOUNTER — RX RENEWAL (OUTPATIENT)
Age: 63
End: 2023-07-10

## 2023-09-07 ENCOUNTER — RESULT REVIEW (OUTPATIENT)
Age: 63
End: 2023-09-07

## 2023-09-11 ENCOUNTER — APPOINTMENT (OUTPATIENT)
Dept: ENDOCRINOLOGY | Facility: CLINIC | Age: 63
End: 2023-09-11
Payer: COMMERCIAL

## 2023-09-11 VITALS
OXYGEN SATURATION: 97 % | BODY MASS INDEX: 34.41 KG/M2 | HEART RATE: 79 BPM | SYSTOLIC BLOOD PRESSURE: 120 MMHG | WEIGHT: 187 LBS | DIASTOLIC BLOOD PRESSURE: 80 MMHG | HEIGHT: 62 IN

## 2023-09-11 DIAGNOSIS — R73.01 IMPAIRED FASTING GLUCOSE: ICD-10-CM

## 2023-09-11 DIAGNOSIS — Z00.00 ENCOUNTER FOR GENERAL ADULT MEDICAL EXAMINATION W/OUT ABNORMAL FINDINGS: ICD-10-CM

## 2023-09-11 LAB
25(OH)D3 SERPL-MCNC: 46.3 NG/ML
ALBUMIN SERPL ELPH-MCNC: 4.7 G/DL
ALP BLD-CCNC: 74 U/L
ALT SERPL-CCNC: 18 U/L
ANION GAP SERPL CALC-SCNC: 12 MMOL/L
AST SERPL-CCNC: 26 U/L
BILIRUB SERPL-MCNC: 0.4 MG/DL
BUN SERPL-MCNC: 12 MG/DL
CALCIUM SERPL-MCNC: 9.7 MG/DL
CALCIUM SERPL-MCNC: 9.7 MG/DL
CHLORIDE SERPL-SCNC: 99 MMOL/L
CO2 SERPL-SCNC: 26 MMOL/L
CREAT SERPL-MCNC: 0.59 MG/DL
EGFR: 100 ML/MIN/1.73M2
GLUCOSE SERPL-MCNC: 110 MG/DL
MAGNESIUM SERPL-MCNC: 2.1 MG/DL
PARATHYROID HORMONE INTACT: 46 PG/ML
PHOSPHATE SERPL-MCNC: 4.2 MG/DL
POTASSIUM SERPL-SCNC: 5.2 MMOL/L
PROT SERPL-MCNC: 7 G/DL
SODIUM SERPL-SCNC: 138 MMOL/L
TSH SERPL-ACNC: 2.03 UIU/ML

## 2023-09-11 PROCEDURE — 99215 OFFICE O/P EST HI 40 MIN: CPT | Mod: 25

## 2023-09-11 PROCEDURE — G0447 BEHAVIOR COUNSEL OBESITY 15M: CPT | Mod: 59

## 2023-09-11 RX ORDER — GUAIFENESIN 600 MG/1
600 TABLET, EXTENDED RELEASE ORAL
Refills: 0 | Status: DISCONTINUED | COMMUNITY
End: 2023-09-11

## 2023-09-11 RX ORDER — MULTIVIT-MIN/IRON/FOLIC ACID/K 18-600-40
500 CAPSULE ORAL
Qty: 45 | Refills: 0 | Status: ACTIVE | COMMUNITY

## 2023-09-11 RX ORDER — CHLORHEXIDINE GLUCONATE 4 %
325 (65 FE) LIQUID (ML) TOPICAL
Qty: 45 | Refills: 0 | Status: ACTIVE | COMMUNITY
Start: 2023-09-11

## 2023-09-11 RX ORDER — GLUC/MSM/COLGN2/HYAL/ANTIARTH3 375-375-20
TABLET ORAL
Refills: 0 | Status: DISCONTINUED | COMMUNITY
End: 2023-09-11

## 2023-09-29 DIAGNOSIS — R92.2 INCONCLUSIVE MAMMOGRAM: ICD-10-CM

## 2023-09-29 DIAGNOSIS — Z12.39 ENCOUNTER FOR OTHER SCREENING FOR MALIGNANT NEOPLASM OF BREAST: ICD-10-CM

## 2023-10-05 ENCOUNTER — TRANSCRIPTION ENCOUNTER (OUTPATIENT)
Age: 63
End: 2023-10-05

## 2023-10-05 LAB
ACTH SER-ACNC: 5 PG/ML
COLLAGEN CTX SERPL-MCNC: 57 PG/ML
CORTIS SERPL-MCNC: 0.8 UG/DL
DEXAMETHASONE LEVEL: 203 NG/DL

## 2024-01-31 ENCOUNTER — APPOINTMENT (OUTPATIENT)
Dept: OBGYN | Facility: CLINIC | Age: 64
End: 2024-01-31
Payer: COMMERCIAL

## 2024-01-31 VITALS
BODY MASS INDEX: 32.2 KG/M2 | SYSTOLIC BLOOD PRESSURE: 120 MMHG | HEIGHT: 62 IN | DIASTOLIC BLOOD PRESSURE: 76 MMHG | WEIGHT: 175 LBS

## 2024-01-31 DIAGNOSIS — Z01.419 ENCOUNTER FOR GYNECOLOGICAL EXAMINATION (GENERAL) (ROUTINE) W/OUT ABNORMAL FINDINGS: ICD-10-CM

## 2024-01-31 DIAGNOSIS — N39.3 STRESS INCONTINENCE (FEMALE) (MALE): ICD-10-CM

## 2024-01-31 PROCEDURE — 99397 PER PM REEVAL EST PAT 65+ YR: CPT

## 2024-01-31 NOTE — PHYSICAL EXAM
[Chaperone Declined] : Patient declined chaperone [Appropriately responsive] : appropriately responsive [Alert] : alert [No Acute Distress] : no acute distress [Soft] : soft [Non-tender] : non-tender [Non-distended] : non-distended [No HSM] : No HSM [No Mass] : no mass [Oriented x3] : oriented x3 [Examination Of The Breasts] : a normal appearance [No Discharge] : no discharge [No Masses] : no breast masses were palpable [No Lesions] : no lesions  [Labia Majora] : normal [Labia Minora] : normal [Pink Rugae] : pink rugae [Normal] : normal [Uterine Adnexae] : normal [Tenderness] : nontender [Enlarged ___ wks] : not enlarged [FreeTextEntry5] : no CMT

## 2024-01-31 NOTE — HISTORY OF PRESENT ILLNESS
[FreeTextEntry1] : 62 y/o  PM presents for annual GYN exam.  Still sexually active with . Using Vagefem twice weekly with improvement.  Still some depression since retiring. Seeing psychiatrist and therapist. Has a older rescue (dog) and getting out, walking him a least three time a day.  History of venous insufficiency.  Otherwise, health is stable.  No PMB bleeding. C/o stress and urgency incontinence that has gotten progressively worse.  Mammogram/ultrasound - BI-RADS 3 (Annual screening advised).  Bone density 3/14/23 osteoporosis- followed by Dr. Inman.   Pt reports normal colonoscopy in 2016.  Pap 22- NILM/HPV-  Mother breast ca.  Denies FH of Ca of ovary, colon, or uterus.  Lives with  and 26-year-old daughter who has history of depression (now improved and attending [employer] for dietician).  Retired teacher.

## 2024-02-02 ENCOUNTER — RX RENEWAL (OUTPATIENT)
Age: 64
End: 2024-02-02

## 2024-02-02 RX ORDER — ESTRADIOL 10 UG/1
10 TABLET, FILM COATED VAGINAL
Qty: 24 | Refills: 3 | Status: ACTIVE | COMMUNITY
Start: 2020-10-07

## 2024-03-18 ENCOUNTER — APPOINTMENT (OUTPATIENT)
Dept: NEUROLOGY | Facility: CLINIC | Age: 64
End: 2024-03-18
Payer: COMMERCIAL

## 2024-03-18 VITALS
HEIGHT: 62 IN | OXYGEN SATURATION: 99 % | SYSTOLIC BLOOD PRESSURE: 142 MMHG | HEART RATE: 75 BPM | DIASTOLIC BLOOD PRESSURE: 83 MMHG | WEIGHT: 175 LBS | BODY MASS INDEX: 32.2 KG/M2

## 2024-03-18 DIAGNOSIS — F41.8 OTHER SPECIFIED ANXIETY DISORDERS: ICD-10-CM

## 2024-03-18 DIAGNOSIS — R41.9 UNSPECIFIED SYMPTOMS AND SIGNS INVOLVING COGNITIVE FUNCTIONS AND AWARENESS: ICD-10-CM

## 2024-03-18 PROCEDURE — 99214 OFFICE O/P EST MOD 30 MIN: CPT

## 2024-03-18 RX ORDER — DEXAMETHASONE 1 MG/1
1 TABLET ORAL
Qty: 1 | Refills: 0 | Status: DISCONTINUED | COMMUNITY
Start: 2023-09-11 | End: 2024-03-18

## 2024-03-18 RX ORDER — BETAMETHASONE DIPROPIONATE 0.5 MG/G
0.05 CREAM TOPICAL
Qty: 30 | Refills: 0 | Status: DISCONTINUED | COMMUNITY
Start: 2022-08-23 | End: 2024-03-18

## 2024-03-18 NOTE — HISTORY OF PRESENT ILLNESS
[FreeTextEntry1] : 3/18/24 Here in f/u. Feels immediate short term memory is not as strong.   Has not been exercising, doing more mental stimulating activities. Socializes.  Following with Dr. Mathew. Taking Escitalopram and Wellbutrin. Tried Ritalin but was not helpful. She feels mood worse during winter.  Does not sleep at same time or wake up at same time.   No lateralizing deficits.    3/28/23 Here in f/u. Doing well.   Memory complaints are stable. Notes occasional word finding.   Taking Escitalopram - does note it helps notes a motivation problem with clutter in the house - lives in apt with   trying to exercise - got a dog to improve exercise  10, 000 steps daily with dog   still trying to get a jail routine going to volunteer at [facility]   seeing Dr. Mathew   3/28/22 Leticia is here in f/u. Continues to note memory issues. Reviewed MRI brain and neuropsychological evaluation. Not sleeping well. Reviewed Sleep study result.  Neuropsycholgical evaluation by Dr. Reed shows no evidence for neuro-degenerative disease. Raises concern for depression and anxiety.  7/28/21 This is a 61-year-old right-handed woman who is being seen in neurologic consultation for evaluation of problems with memory.  She notes difficulty in both short-term and long-term memory.  She is more forgetful.  There is significant word finding problems.  She has not noticed any problems driving or recognizing people.  Her  pays the bills so she has not noticed any problems.  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  She is having problems with insomnia and snoring. Was advised to get sleep study but has not done so.   Mother- dementia and PD

## 2024-03-18 NOTE — ASSESSMENT
[FreeTextEntry1] : Discussed importance of being physically, socially, and mentally active. I don't think repeating neuropsychological evaluation would be helpful. Discussed sleep hygiene and need for routine.   Discussed cognitive behavioral therapy and provided information.   f/u with Dr. Mathew.   f/u with maximus FERNANDEZ.

## 2024-03-18 NOTE — DATA REVIEWED
[de-identified] : \par   [place] MRI Report             Final\par  \par  No Documents Attached\par  \par  \par  \par  \par    [facility]\par                                           [address]\par                                     [place], [place]  [address]\par                                         Department of Radiology\par                                              [phone]\par  \par  \par  Patient Name:      DIOGO ARNOLD                Location:       [facility]\par  Med Rec #:        [id number]                    Account #:      [id number]\par  YOB: 1960                    Ordering:       Daisy Gatica MD\par  Age: 61               Sex:    F                 Attending:      Daisy Gatica MD\par  PCP:        Emiliano Garrison MD\par  ______________________________________________________________________________________\par  \par  Exam Date:      09/15/21\par  Exam:         MRI BRAIN\par  Order#:       [id number]\par  \par  \par  \par  HISTORY: 61 years Female MEMORY CHANGE MRI\par  \par   PROCEDURE: MRI brain performed without contrast\par  \par   COMPARISON: None\par  \par   TECHNIQUE:  MRI BRAIN 1.5 Tea magnet without administration of intravenous contrast.\par  \par  \par   FINDINGS:\par   Sagittal T1-weighted imaging demonstrates normal appearance of the midline structures including the\par  clivus, sella, and craniocervical junction.\par  \par   There is no extra-axial collection, intraparenchymal hemorrhage, midline shift, or mass effect\par  \par   The ventricles and basilar cisterns appear within range of normal.\par  \par   There is no restricted diffusion on today's exam to suggest a recent ischemic event.\par  \par   There is no abnormal susceptibility artifact to suggest microhemorrhage is or deposition disease.\par  \par   The central [place] flow voids and major dural venous sinus flow voids are present.\par  \par   The extracranial tissues including the orbits appear within range of normal.\par  \par  \par  \par   IMPRESSION:\par   No evidence of acute intracranial process\par  \par   Thank you for allowing us to participate in the evaluation of this patient.\par  \par   --- End of Report ---\par  \par  ***Electronically Signed ***\par  -----------------------------------------------\par  René Holcomb MD              09/15/21 1424\par  \par  Dictated on 09/15/21\par  \par  \par  Report cc:  Daisy Gatica MD;\par  \par   \par  \par   Ordered by: DAISY GATICA       Collected/Examined: 15Sep2021 09:19AM       \par  Verified by: DAISY GATICA 15Sep2021 03:37PM       \par   Result Communication: No patient communication needed at this time;\par  Stage: Final       \par   Performed at: [facility]       Resulted: 15Sep2021 02:24PM       Last Updated: 15Sep2021 03:37PM       Accession: [id number]

## 2024-03-18 NOTE — REVIEW OF SYSTEMS
[Joint Pain] : joint pain [Joint Stiffness] : joint stiffness [Negative] : ENT [Fever] : no fever [Feeling Tired] : not feeling tired [Chills] : no chills [Numbness] : no numbness [Tingling] : no tingling [Seizures] : no convulsions [Dizziness] : no dizziness [Tension Headache] : no tension-type headache [Anxiety] : no anxiety [Depression] : no depression [Eye Pain] : no eye pain [Red Eyes] : eyes not red [Loss Of Hearing] : no hearing loss [Chest Pain] : no chest pain [Palpitations] : no palpitations [Shortness Of Breath] : no shortness of breath [Wheezing] : no wheezing [Cough] : no cough [Constipation] : no constipation [Diarrhea] : no diarrhea [Incontinence] : no incontinence [Pelvic Pain] : no pelvic pain [Joint Swelling] : no joint swelling [Muscle Weakness] : no muscle weakness [Itching] : no itching [Easy Bleeding] : no tendency for easy bleeding [Easy Bruising] : no tendency for easy bruising [Swollen Glands] : no swollen glands

## 2024-03-18 NOTE — CONSULT LETTER
[Dear  ___] : Dear  [unfilled], [Please see my note below.] : Please see my note below. [DrMarlena  ___] : Dr. VELA [Courtesy Letter:] : I had the pleasure of seeing your patient, [unfilled], in my office today. [FreeTextEntry3] : Sincerely,\par  \par  Melida Gatica M.D.\par

## 2024-03-25 ENCOUNTER — RX RENEWAL (OUTPATIENT)
Age: 64
End: 2024-03-25

## 2024-03-25 LAB
25(OH)D3 SERPL-MCNC: 45.9 NG/ML
ALBUMIN SERPL ELPH-MCNC: 4.5 G/DL
ALP BLD-CCNC: 72 U/L
ALT SERPL-CCNC: 17 U/L
ANION GAP SERPL CALC-SCNC: 11 MMOL/L
AST SERPL-CCNC: 18 U/L
BILIRUB SERPL-MCNC: 0.2 MG/DL
BUN SERPL-MCNC: 14 MG/DL
CALCIUM SERPL-MCNC: 9.3 MG/DL
CALCIUM SERPL-MCNC: 9.3 MG/DL
CHLORIDE SERPL-SCNC: 98 MMOL/L
CHOLEST SERPL-MCNC: 190 MG/DL
CO2 SERPL-SCNC: 27 MMOL/L
CREAT SERPL-MCNC: 0.61 MG/DL
EGFR: 99 ML/MIN/1.73M2
ESTIMATED AVERAGE GLUCOSE: 108 MG/DL
GLUCOSE SERPL-MCNC: 92 MG/DL
HBA1C MFR BLD HPLC: 5.4 %
HDLC SERPL-MCNC: 60 MG/DL
LDLC SERPL CALC-MCNC: 110 MG/DL
MAGNESIUM SERPL-MCNC: 1.9 MG/DL
NONHDLC SERPL-MCNC: 130 MG/DL
PARATHYROID HORMONE INTACT: 39 PG/ML
PHOSPHATE SERPL-MCNC: 4.2 MG/DL
POTASSIUM SERPL-SCNC: 4.8 MMOL/L
PROT SERPL-MCNC: 6.4 G/DL
SODIUM SERPL-SCNC: 136 MMOL/L
T4 FREE SERPL-MCNC: 1.5 NG/DL
TRIGL SERPL-MCNC: 111 MG/DL
TSH SERPL-ACNC: 2.31 UIU/ML

## 2024-03-25 RX ORDER — METFORMIN ER 500 MG 500 MG/1
500 TABLET ORAL
Qty: 180 | Refills: 1 | Status: ACTIVE | COMMUNITY
Start: 2023-09-11 | End: 1900-01-01

## 2024-04-04 ENCOUNTER — RX RENEWAL (OUTPATIENT)
Age: 64
End: 2024-04-04

## 2024-04-05 NOTE — REASON FOR VISIT
Spoke with shirley scheduled patient gave [facility] instructions            [facility] PRE OP INSTRUCTIONS:             Please do not eat or drink 1 hour prior to the procedure.          If you are not feeling well, have any kind of infection or are placed on antibiotics for any reason, please call the office,           we will have to reschedule your procedure.           Patient on ABX procedure canceled till off ABX and S/S free for 48 hours          You will need a , 18 years or older.           Reviewed instructions: , NPO 1 hour prior, loose-fitting/comfortable clothes, if ill/fever/infx/abx to call and reschedule.            Also pain level at leat 5/10 if it is not please call and talk to nurse [phone].           Please continue to take any long acting pain medication as prescribed.          Refrain from PRN, as-needed pain meds 6h prior and 6-8 hours after anything you would buy over the counter at a store.          It is recommended that you try to continue with your normal activities of daily living to see if the medial branch block is helping.           You will be sent home with a pain diary that you will need to return to us either by dropping it off, mailing it in, faxing it or you can           upload it to your my chart for the doctor to review to allow us to assess the next steps in your treatment.     [Follow-Up: _____] : a [unfilled] follow-up visit

## 2024-04-08 ENCOUNTER — APPOINTMENT (OUTPATIENT)
Dept: ENDOCRINOLOGY | Facility: CLINIC | Age: 64
End: 2024-04-08
Payer: COMMERCIAL

## 2024-04-08 VITALS
DIASTOLIC BLOOD PRESSURE: 80 MMHG | HEIGHT: 61.25 IN | HEART RATE: 83 BPM | OXYGEN SATURATION: 98 % | WEIGHT: 181 LBS | BODY MASS INDEX: 33.74 KG/M2 | SYSTOLIC BLOOD PRESSURE: 112 MMHG

## 2024-04-08 DIAGNOSIS — R73.03 PREDIABETES.: ICD-10-CM

## 2024-04-08 DIAGNOSIS — E78.5 HYPERLIPIDEMIA, UNSPECIFIED: ICD-10-CM

## 2024-04-08 DIAGNOSIS — E04.1 NONTOXIC SINGLE THYROID NODULE: ICD-10-CM

## 2024-04-08 DIAGNOSIS — E66.9 OBESITY, UNSPECIFIED: ICD-10-CM

## 2024-04-08 DIAGNOSIS — M81.0 AGE-RELATED OSTEOPOROSIS W/OUT CURRENT PATHOLOGICAL FRACTURE: ICD-10-CM

## 2024-04-08 DIAGNOSIS — E03.9 HYPOTHYROIDISM, UNSPECIFIED: ICD-10-CM

## 2024-04-08 LAB
25(OH)D3 SERPL-MCNC: 51.5 NG/ML
ALBUMIN SERPL ELPH-MCNC: 4.7 G/DL
ALP BLD-CCNC: 67 U/L
ALT SERPL-CCNC: 9 U/L
ANION GAP SERPL CALC-SCNC: 12 MMOL/L
AST SERPL-CCNC: 19 U/L
BILIRUB SERPL-MCNC: 0.2 MG/DL
BUN SERPL-MCNC: 15 MG/DL
CALCIUM SERPL-MCNC: 9.3 MG/DL
CALCIUM SERPL-MCNC: 9.3 MG/DL
CHLORIDE SERPL-SCNC: 102 MMOL/L
CHOLEST SERPL-MCNC: 201 MG/DL
CO2 SERPL-SCNC: 23 MMOL/L
CREAT SERPL-MCNC: 0.56 MG/DL
EGFR: 101 ML/MIN/1.73M2
ESTIMATED AVERAGE GLUCOSE: 108 MG/DL
GLUCOSE SERPL-MCNC: 88 MG/DL
HBA1C MFR BLD HPLC: 5.4 %
HDLC SERPL-MCNC: 57 MG/DL
LDLC SERPL CALC-MCNC: 111 MG/DL
MAGNESIUM SERPL-MCNC: 2 MG/DL
NONHDLC SERPL-MCNC: 144 MG/DL
PARATHYROID HORMONE INTACT: 57 PG/ML
PHOSPHATE SERPL-MCNC: 3.6 MG/DL
POTASSIUM SERPL-SCNC: 5 MMOL/L
PROT SERPL-MCNC: 6.9 G/DL
SODIUM SERPL-SCNC: 136 MMOL/L
T4 FREE SERPL-MCNC: 1.4 NG/DL
TRIGL SERPL-MCNC: 189 MG/DL
TSH SERPL-ACNC: 2.13 UIU/ML

## 2024-04-08 PROCEDURE — G0444 DEPRESSION SCREEN ANNUAL: CPT | Mod: 59

## 2024-04-08 PROCEDURE — 99215 OFFICE O/P EST HI 40 MIN: CPT | Mod: 25

## 2024-04-08 PROCEDURE — G0447 BEHAVIOR COUNSEL OBESITY 15M: CPT | Mod: 59

## 2024-04-08 RX ORDER — ACETAMINOPHEN 325 MG
TABLET ORAL DAILY
Refills: 0 | Status: ACTIVE | COMMUNITY

## 2024-04-08 RX ORDER — ALENDRONATE SODIUM 70 MG/1
70 TABLET ORAL
Qty: 12 | Refills: 2 | Status: ACTIVE | COMMUNITY
Start: 2023-02-02 | End: 1900-01-01

## 2024-04-08 RX ORDER — BUPROPION HYDROCHLORIDE 150 MG/1
150 TABLET, EXTENDED RELEASE ORAL DAILY
Refills: 0 | Status: ACTIVE | COMMUNITY

## 2024-04-08 RX ORDER — LEVOTHYROXINE SODIUM 0.05 MG/1
50 TABLET ORAL
Qty: 117 | Refills: 2 | Status: ACTIVE | COMMUNITY
Start: 2023-07-10 | End: 1900-01-01

## 2024-04-08 NOTE — ASSESSMENT
[Other reason not done] : Other reason not done [Little interest or pleasure doing things] : 1) Little interest or pleasure doing things [Feeling down, depressed, or hopeless] : 2) Feeling down, depressed, or hopeless [2] : 2) Feeling down, depressed, or hopeless for more than half of the days (2) [Nearly Every Day (3)] : 5.) Poor appetite or overeating? Nearly every day [Several Days (1)] : 7.) Trouble concentrating on things, such as reading a newspaper or watching television? Several days [Not at All (0)] : 9.) Thoughts that you would be off dead or of hurting yourself in some way? Not at all [Mild] : Severity of Depression is Mild [Somewhat Difficult] : How difficult have these problems made it for you to do your work, take care of things at home, or get along with people? Somewhat difficult [PHQ-9 Negative - No further assessment needed] : PHQ-9 Negative - No further assessment needed [I have developed a follow-up plan documented below in the note.] : I have developed a follow-up plan documented below in the note. [FreeTextEntry1] : 5 min spent on depression screening [de-identified] : on meds [[id number]] : 9

## 2024-04-08 NOTE — ADDENDUM
[FreeTextEntry1] : 9/11/23 RN DC -med list updated -Wellbutrin  mg QD recently started last month by psychiatrist Dr. Mathew

## 2024-04-08 NOTE — END OF VISIT
[Time Spent: ___ minutes] : I have spent [unfilled] minutes of time on the encounter. [FreeTextEntry3] :  I, Satya Dias, am scribing for and in the presence of Dr. Bridget Inman in the following sections: HISTORY OF PRESENT ILLNESS; REVIEW OF SYSTEMS; PHYSICAL EXAM; ASSESSMENT/ PLAN. I, Bridget Inman, personally performed the services described in the documentation, reviewed the documentation recorded by the scribe in my presence, and it accurately and completely records my words and actions. 4/8/2024.

## 2024-04-08 NOTE — REVIEW OF SYSTEMS
[Fatigue] : fatigue [Lower Ext Edema] : lower extremity edema [Cough] : cough [Nocturia] : nocturia [Myalgia] : myalgia  [Muscle Cramps] : muscle cramps [Depression] : depression [Polydipsia] : polydipsia [Negative] : Heme/Lymph [Heartburn] : heartburn [Insomnia] : insomnia [Polyuria] : no polyuria [FreeTextEntry2] : chronic fatigue [FreeTextEntry5] : venous insufficiency  [FreeTextEntry6] : loud snoring  [FreeTextEntry8] : urinary frequency  [de-identified] : itching  [de-identified] : memory problems, problems with concentration

## 2024-04-08 NOTE — HISTORY OF PRESENT ILLNESS
[FreeTextEntry1] : Ms. DIOGO ARNOLD is a 63 year old female self-referred for chronic fatigue hypothyroidism prediabetes today first follow-up restarted vaginal estrogen today  Has not seen endocrinology before  her daughter  a patient of mine hypothyroidism for 10-15yrs  tracheal stenosis requring dilatations mild slpeep apnea on 10/21 sleep study reviewed  also impaired fasting sugar per pt from last labwork   mother Miki Mena, also she has had memory problems seen neurology was told all good   TSH 4.67 Fasting sugar 113, paternal aunt DM2  FHx thyroid cancer  4/8/2024 osteo follow up-  A1c 5.4%. Sugars were elevated on labs. Takes metformin 500 mg BID.  Thyroid levels were good.  Calcium went down because she has been forgetting the second dose. Reinforced compliance with both doses.  Taking Fosamax with reported side effect of heartburn. She is also taking metformin which could be causing this. Pt states heartburn has improved since starting on Pepcid.  Going to bed very late at night and has trouble falling asleep. Reports height loss. Used to be 5 feet 2.5 inches and is now around 5 feet 1.25 inches.

## 2024-04-15 RX ORDER — NYSTATIN 100000 [USP'U]/G
100000 CREAM TOPICAL
Qty: 1 | Refills: 3 | Status: ACTIVE | COMMUNITY
Start: 1900-01-01 | End: 1900-01-01

## 2024-10-08 ENCOUNTER — APPOINTMENT (OUTPATIENT)
Dept: ENDOCRINOLOGY | Facility: CLINIC | Age: 64
End: 2024-10-08
Payer: COMMERCIAL

## 2024-10-08 VITALS
HEART RATE: 81 BPM | SYSTOLIC BLOOD PRESSURE: 104 MMHG | DIASTOLIC BLOOD PRESSURE: 70 MMHG | OXYGEN SATURATION: 96 % | BODY MASS INDEX: 33.55 KG/M2 | WEIGHT: 180 LBS | HEIGHT: 61.25 IN

## 2024-10-08 DIAGNOSIS — E03.9 HYPOTHYROIDISM, UNSPECIFIED: ICD-10-CM

## 2024-10-08 DIAGNOSIS — E78.5 HYPERLIPIDEMIA, UNSPECIFIED: ICD-10-CM

## 2024-10-08 DIAGNOSIS — E66.811 OBESITY, CLASS 1: ICD-10-CM

## 2024-10-08 DIAGNOSIS — E04.1 NONTOXIC SINGLE THYROID NODULE: ICD-10-CM

## 2024-10-08 DIAGNOSIS — M81.0 AGE-RELATED OSTEOPOROSIS W/OUT CURRENT PATHOLOGICAL FRACTURE: ICD-10-CM

## 2024-10-08 DIAGNOSIS — N95.2 POSTMENOPAUSAL ATROPHIC VAGINITIS: ICD-10-CM

## 2024-10-08 DIAGNOSIS — R73.03 PREDIABETES.: ICD-10-CM

## 2024-10-08 PROCEDURE — G2211 COMPLEX E/M VISIT ADD ON: CPT | Mod: NC

## 2024-10-08 PROCEDURE — 99401 PREV MED CNSL INDIV APPRX 15: CPT

## 2024-10-08 PROCEDURE — 99215 OFFICE O/P EST HI 40 MIN: CPT

## 2024-10-08 RX ORDER — NALTREXONE HYDROCHLORIDE AND BUPROPION HYDROCHLORIDE 8; 90 MG/1; MG/1
8-90 TABLET, EXTENDED RELEASE ORAL
Qty: 30 | Refills: 2 | Status: ACTIVE | COMMUNITY
Start: 2024-10-08 | End: 1900-01-01

## 2024-12-30 ENCOUNTER — RX RENEWAL (OUTPATIENT)
Age: 64
End: 2024-12-30

## 2025-01-09 ENCOUNTER — RX RENEWAL (OUTPATIENT)
Age: 65
End: 2025-01-09

## 2025-03-10 ENCOUNTER — RX RENEWAL (OUTPATIENT)
Age: 65
End: 2025-03-10

## 2025-04-02 DIAGNOSIS — Z12.39 ENCOUNTER FOR OTHER SCREENING FOR MALIGNANT NEOPLASM OF BREAST: ICD-10-CM

## 2025-04-03 ENCOUNTER — LABORATORY RESULT (OUTPATIENT)
Age: 65
End: 2025-04-03

## 2025-04-07 ENCOUNTER — NON-APPOINTMENT (OUTPATIENT)
Age: 65
End: 2025-04-07

## 2025-04-08 ENCOUNTER — APPOINTMENT (OUTPATIENT)
Dept: ENDOCRINOLOGY | Facility: CLINIC | Age: 65
End: 2025-04-08
Payer: COMMERCIAL

## 2025-04-08 VITALS
SYSTOLIC BLOOD PRESSURE: 130 MMHG | HEIGHT: 62.5 IN | WEIGHT: 175 LBS | BODY MASS INDEX: 31.4 KG/M2 | DIASTOLIC BLOOD PRESSURE: 76 MMHG | OXYGEN SATURATION: 98 % | HEART RATE: 76 BPM

## 2025-04-08 DIAGNOSIS — E03.9 HYPOTHYROIDISM, UNSPECIFIED: ICD-10-CM

## 2025-04-08 DIAGNOSIS — R73.03 PREDIABETES.: ICD-10-CM

## 2025-04-08 DIAGNOSIS — G47.33 OBSTRUCTIVE SLEEP APNEA (ADULT) (PEDIATRIC): ICD-10-CM

## 2025-04-08 DIAGNOSIS — E66.811 OBESITY, CLASS 1: ICD-10-CM

## 2025-04-08 DIAGNOSIS — E78.5 HYPERLIPIDEMIA, UNSPECIFIED: ICD-10-CM

## 2025-04-08 DIAGNOSIS — R73.01 IMPAIRED FASTING GLUCOSE: ICD-10-CM

## 2025-04-08 DIAGNOSIS — M81.0 AGE-RELATED OSTEOPOROSIS W/OUT CURRENT PATHOLOGICAL FRACTURE: ICD-10-CM

## 2025-04-08 DIAGNOSIS — E04.1 NONTOXIC SINGLE THYROID NODULE: ICD-10-CM

## 2025-04-08 PROCEDURE — 99215 OFFICE O/P EST HI 40 MIN: CPT

## 2025-04-08 PROCEDURE — G2211 COMPLEX E/M VISIT ADD ON: CPT | Mod: NC

## 2025-04-10 ENCOUNTER — NON-APPOINTMENT (OUTPATIENT)
Age: 65
End: 2025-04-10

## 2025-09-10 ENCOUNTER — RX RENEWAL (OUTPATIENT)
Age: 65
End: 2025-09-10